# Patient Record
Sex: MALE | Race: WHITE | NOT HISPANIC OR LATINO | URBAN - METROPOLITAN AREA
[De-identification: names, ages, dates, MRNs, and addresses within clinical notes are randomized per-mention and may not be internally consistent; named-entity substitution may affect disease eponyms.]

---

## 2019-06-15 ENCOUNTER — EMERGENCY (EMERGENCY)
Facility: HOSPITAL | Age: 69
LOS: 1 days | Discharge: HOME | End: 2019-06-15
Admitting: EMERGENCY MEDICINE
Payer: COMMERCIAL

## 2019-06-15 VITALS
OXYGEN SATURATION: 95 % | RESPIRATION RATE: 16 BRPM | SYSTOLIC BLOOD PRESSURE: 150 MMHG | DIASTOLIC BLOOD PRESSURE: 91 MMHG | HEART RATE: 89 BPM | TEMPERATURE: 98 F | WEIGHT: 199.96 LBS

## 2019-06-15 DIAGNOSIS — J02.9 ACUTE PHARYNGITIS, UNSPECIFIED: ICD-10-CM

## 2019-06-15 DIAGNOSIS — H57.89 OTHER SPECIFIED DISORDERS OF EYE AND ADNEXA: ICD-10-CM

## 2019-06-15 DIAGNOSIS — H10.9 UNSPECIFIED CONJUNCTIVITIS: ICD-10-CM

## 2019-06-15 PROCEDURE — 99283 EMERGENCY DEPT VISIT LOW MDM: CPT

## 2019-06-15 RX ORDER — POLYMYXIN B SULF/TRIMETHOPRIM 10000-1/ML
1 DROPS OPHTHALMIC (EYE) ONCE
Refills: 0 | Status: COMPLETED | OUTPATIENT
Start: 2019-06-15 | End: 2019-06-15

## 2019-06-15 RX ADMIN — Medication 1 DROP(S): at 21:39

## 2019-06-15 NOTE — ED PROVIDER NOTE - PHYSICAL EXAMINATION
VITAL SIGNS: I have reviewed nursing notes and confirm.  CONSTITUTIONAL: Well-developed; well-nourished; in no acute distress.  SKIN: Skin is warm and dry, no acute rash.  HEAD: Normocephalic; atraumatic.  EYES: BL conjunctival injection. Eyes do not appear angry. PERRL BL. Ocular movements intact. BL yellow crusting at the eyelashes bilaterally.   ENT: No nasal discharge; airway clear.  NECK: Supple; non tender.  CARD: S1, S2 normal; no murmurs, gallops, or rubs. Regular rate and rhythm.  RESP: No wheezes, rales or rhonchi.  ABD: Normal bowel sounds; soft; non-distended; non-tender; no hepatosplenomegaly.  EXT: Normal ROM. No clubbing, cyanosis or edema.  NEURO: Alert, oriented. Grossly unremarkable.  PSYCH: Cooperative, appropriate. VITAL SIGNS: I have reviewed nursing notes and confirm.  CONSTITUTIONAL: Well-developed; well-nourished; in no acute distress.  SKIN: Skin is warm and dry, no acute rash.  HEAD: Normocephalic; atraumatic.  EYES: BL conjunctival injection, eyes do not appear angry, no hazy cornea. +PERRL BL. EOM intact.. BL yellow crusting at the eyelashes bilaterally. good VA bilaterally.   ENT: pharynx clear, no erythema, no exudates, uvula midline. no cervical LAD. no drooling, tolerating PO.   NECK: Supple; non tender.  CARD: S1, S2 normal; no murmurs, gallops, or rubs. Regular rate and rhythm.  RESP: No wheezes, rales or rhonchi.  NEURO: Alert, oriented. Grossly unremarkable.  PSYCH: Cooperative, appropriate.

## 2019-06-15 NOTE — ED PROVIDER NOTE - NSFOLLOWUPINSTRUCTIONS_ED_ALL_ED_FT
Apply Polytrim 1 drop to both eyes every 4 hours for 7 days.  Wash hands frequently, wash around eyes with baby shampoo -- keep clean  Drink plenty of fluids. Rest.  Pink eye is contagious - it is spread by touching your eye and then touching someone else - please be mindful of this  Follow up with your doctor in 1-2 days    RETURN TO THE EMERGENCY DEPARTMENT FOR WORSENING PAIN, FEVER, VISUAL CHANGES, OR ANY CONCERNING OR WORSENING SYMPTOMS    Conjunctivitis    Conjunctivitis is an inflammation of the clear membrane that covers the white part of your eye and the inner surface of your eyelid (conjunctiva). Symptoms include eye redness, eye pain, tearing and drainage, crusting of eyelids, swollen eyelids, and light sensitivity. Conjunctivitis may be contagious and easily spread from person to person. It can be caused by a virus, bacteria, or as part of an allergic reaction; the treatment depends on the type of conjunctivitis suspected. Avoid touching or rubbing your eyes and wipe away any drainage gently with a warm wet washcloth. Do not wear contact lenses until the inflammation is gone – wear glasses until your health care provider says it is safe to wear contact again. Do not share towels or washcloths that may spread the infection and wash your hands frequently.    SEEK IMMEDIATE MEDICAL CARE IF YOU HAVE ANY OF THE FOLLOWING SYMPTOMS: increasing pain, blurry vision, blindness, fever, or facial pain/redness/swelling.

## 2019-06-15 NOTE — ED PROVIDER NOTE - CARE PROVIDER_API CALL
Dusty Bazan)  Ophthalmology  [address] [address]  Phone: [phone]  Fax: [phone]  Follow Up Time:

## 2019-06-15 NOTE — ED PROVIDER NOTE - CLINICAL SUMMARY MEDICAL DECISION MAKING FREE TEXT BOX
bilateral conjunctivitis, will treat with polytrim, advised hand hygiene, discussed with patient conjunctivitis is contagious, advised supportive care, return precautions discussed, f/u PMD, return precautions discussed.

## 2019-06-15 NOTE — ED ADULT NURSE NOTE - NSIMPLEMENTINTERV_GEN_ALL_ED
Implemented All Universal Safety Interventions:  [place] to call system. Call bell, personal items and telephone within reach. Instruct patient to call for assistance. Room bathroom lighting operational. Non-slip footwear when patient is off stretcher. Physically safe environment: no spills, clutter or unnecessary equipment. Stretcher in lowest position, wheels locked, appropriate side rails in place.

## 2019-06-15 NOTE — ED PROVIDER NOTE - OBJECTIVE STATEMENT
70 y/o M with PMHx of depression presents to ED with 1 day of itchy eyes and a few days of non productive coughs. Pt reports of having a white discharge from the eyes, as well as crusting. He denies any eye pain or fevers. 70 y/o M with PMHx of depression presents to ED with bilateral eye redness with clear/yellow discharge with crusting with sore throat and mild cough x 2 days. No eye pain or fever. no visual changes. wears corrective lens, does not wear contacts. denies trauma

## 2021-08-19 NOTE — ED ADULT NURSE NOTE - NS ED NOTE ABUSE RESPONSE YN
Is This A New Presentation, Or A Follow-Up?: Skin Lesion
How Severe Is Your Skin Lesion?: moderate
Yes

## 2023-05-06 ENCOUNTER — EMERGENCY (EMERGENCY)
Facility: HOSPITAL | Age: 73
LOS: 1 days | Discharge: HOME | End: 2023-05-06
Attending: EMERGENCY MEDICINE | Admitting: EMERGENCY MEDICINE
Payer: COMMERCIAL

## 2023-05-06 VITALS
WEIGHT: 240.08 LBS | SYSTOLIC BLOOD PRESSURE: 120 MMHG | TEMPERATURE: 99 F | HEART RATE: 96 BPM | DIASTOLIC BLOOD PRESSURE: 74 MMHG | RESPIRATION RATE: 18 BRPM | OXYGEN SATURATION: 99 %

## 2023-05-06 VITALS
DIASTOLIC BLOOD PRESSURE: 75 MMHG | OXYGEN SATURATION: 94 % | SYSTOLIC BLOOD PRESSURE: 119 MMHG | RESPIRATION RATE: 18 BRPM | TEMPERATURE: 98 F | HEART RATE: 90 BPM

## 2023-05-06 PROBLEM — F32.9 MAJOR DEPRESSIVE DISORDER, SINGLE EPISODE, UNSPECIFIED: Chronic | Status: ACTIVE | Noted: 2019-06-15

## 2023-05-06 PROCEDURE — 99283 EMERGENCY DEPT VISIT LOW MDM: CPT

## 2023-05-06 RX ORDER — OFLOXACIN 0.3 %
1 DROPS OPHTHALMIC (EYE) ONCE
Refills: 0 | Status: COMPLETED | OUTPATIENT
Start: 2023-05-06 | End: 2023-05-06

## 2023-05-06 RX ORDER — GLYCERIN 1 %
1 DROPS OPHTHALMIC (EYE) ONCE
Refills: 0 | Status: COMPLETED | OUTPATIENT
Start: 2023-05-06 | End: 2023-05-06

## 2023-05-06 RX ORDER — GLYCERIN 1 %
2 DROPS OPHTHALMIC (EYE)
Qty: 10 | Refills: 0
Start: 2023-05-06 | End: 2023-05-10

## 2023-05-06 RX ORDER — OFLOXACIN 0.3 %
1 DROPS OPHTHALMIC (EYE)
Qty: 5 | Refills: 0
Start: 2023-05-06 | End: 2023-05-10

## 2023-05-06 RX ADMIN — Medication 1 DROP(S): at 13:46

## 2023-05-06 NOTE — ED ADULT NURSE NOTE - NSIMPLEMENTINTERV_GEN_ALL_ED
Abby (mother)
Implemented All Universal Safety Interventions:  [place] to call system. Call bell, personal items and telephone within reach. Instruct patient to call for assistance. Room bathroom lighting operational. Non-slip footwear when patient is off stretcher. Physically safe environment: no spills, clutter or unnecessary equipment. Stretcher in lowest position, wheels locked, appropriate side rails in place.

## 2023-05-06 NOTE — ED PROVIDER NOTE - EYES, MLM
R eye conjunctival erythema w some green discharge, normal extraocular muscles, normal pupillary reaction, no signs of corneal abrasion or fb on fluro exam

## 2023-05-06 NOTE — ED PROVIDER NOTE - CLINICAL SUMMARY MEDICAL DECISION MAKING FREE TEXT BOX
Patient presents with right I isolated conjunctival erythema.  Otherwise normal examination, normal extraocular muscles normal pupillary reaction and no signs of corneal abrasions on fluorescein strip exam.  Recommend antihistamine drops for supportive treatment and started on topical antibiotic.  Recommend follow-up with ophthalmology if any worsening symptoms.

## 2023-05-06 NOTE — ED PROVIDER NOTE - PATIENT PORTAL LINK FT
You can access the FollowMyHealth Patient Portal offered by [facility] by registering at the following website: [email or web address]. By joining [employer]’s FollowMyHealth portal, you will also be able to view your health information using other applications (apps) compatible with our system.

## 2023-05-06 NOTE — ED ADULT NURSE NOTE - OBJECTIVE STATEMENT
Pt is a 28y male c/o right eye itching, redness, crusting, burning, swelling, draining x 2 days. Pt denies blurry vision, contact, or glasses use. NKDA.

## 2023-05-06 NOTE — ED ADULT NURSE NOTE - EXTENSIONS OF SELF_ADULT
Continue: prednisolone acetate (PF) (prednisolone acetate (pf)): drops,suspension: 1% 1 drop four times a day as directed into left eye 12- None

## 2023-05-06 NOTE — ED PROVIDER NOTE - OBJECTIVE STATEMENT
73-year-old male patient, no history of medical problems, no chronic medications.  Patient presents for 1 day of right eye initially foreign body sensation and then some redness and discomfort, also noted some crusting yellow or greenish discharge from the right eye.  No blurred vision.  No signs of trauma.  Patient has had some spring allergy symptoms recently.  Has not been taking any medication for spring allergies.  No headache no neck pain no fever no chills no trouble with eye movement. not a contact lense wearer

## 2023-05-06 NOTE — ED PROVIDER NOTE - CARE PROVIDER_API CALL
Dusty Bazan  OPHTHALMOLOGY  [address] [address]  Phone: [phone]  Fax: [phone]  Follow Up Time:

## 2023-05-09 DIAGNOSIS — H10.9 UNSPECIFIED CONJUNCTIVITIS: ICD-10-CM
